# Patient Record
(demographics unavailable — no encounter records)

---

## 2024-11-27 NOTE — HEALTH RISK ASSESSMENT
[Yes] : Yes [2 - 4 times a month (2 pts)] : 2-4 times a month (2 points) [1 or 2 (0 pts)] : 1 or 2 (0 points) [Never (0 pts)] : Never (0 points) [0] : 2) Feeling down, depressed, or hopeless: Not at all (0) [PHQ-2 Negative - No further assessment needed] : PHQ-2 Negative - No further assessment needed [Student] : student [Single] : single [Feels Safe at Home] : Feels safe at home [Fully functional (bathing, dressing, toileting, transferring, walking, feeding)] : Fully functional (bathing, dressing, toileting, transferring, walking, feeding) [Fully functional (using the telephone, shopping, preparing meals, housekeeping, doing laundry, using] : Fully functional and needs no help or supervision to perform IADLs (using the telephone, shopping, preparing meals, housekeeping, doing laundry, using transportation, managing medications and managing finances) [Never] : Never [Audit-CScore] : 2 [de-identified] : smoked cannibis nightly but recently stopped  [[id number]] : 0 [Sexually Active] : not sexually active [de-identified] : mother, father, biological brother/sister, adopted brother  [FreeTextEntry2] : 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

## 2024-11-27 NOTE — ASSESSMENT
[FreeTextEntry1] : Health Care Maintenance - well visit - routine labs ordered; patient consents to STD testing - depression screen negative - flu vaccine- declines  - covid vaccines- reports 2 doses  - tdap 1/2023 - advised to get annual eye exams with optometry/ophthalmology, skin exams with dermatology, and dental exams - RTC for CPE in 1 year or sooner prn  Asthma -controlled off medications, sometimes requires inhalers when sick  Anxiety/depression OCD ADHD -not on any meds -follows Therapist Shay crowley
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
## 2025-01-29 NOTE — ADDENDUM
[FreeTextEntry1] :  I, Ale Christianson, acted solely as a scribe for Dr. Jed Vernon MD on this date 01/29/2025    All medical record entries made by the Scribe were at my, Dr. Jed Vernon MD., direction and personally dictated by me on 01/29/2025 . I have reviewed the chart and agree that the record accurately reflects my personal performance of the history, physical exam, assessment and plan. I have also personally directed, reviewed, and agreed with the chart.

## 2025-01-29 NOTE — PHYSICAL EXAM
[de-identified] :  Cervical Physical Exam   Gait - Normal   Station - Normal   Sagittal balance - Normal   Compensatory mechanism? - None   Horizontal gaze - Maintained     Reflexes Biceps - Normal Triceps - Normal Brachioradialis - Normal Patellar - Normal Gastroc - Normal Clonus -No   Hoffmans - None   Shoulder Exam - Normal   Spurlings - None   Wrist Pulses -2+ radial/ulnar   Foot Pulses -2+ DP/PT   Cervical range of motion - Normal   Sensation C5-T1 sensation intact to light touch bilaterally   L1-S1 sensation intact to light touch bilaterally   Motor   Deltoid Biceps Triceps WF WE IO  Right 5/5 5/5 5/5 5/5 5/5 5/5 5/5 Left 5/5 5/5 5/5 5/5 5/5 5/5 5/5   IP Quad HS TA Gastroc EHL Right 3+/5 5/5 5/5 5/5 5/5 5/5 Left 3+/5 5/5 5/5 5/5 5/5 5/5 [de-identified] : Xray from [facility] Lumbar radiographs no significant facet arthropathy no significant disc degeneration

## 2025-01-29 NOTE — ASSESSMENT
[FreeTextEntry1] : I had a lengthy discussion with the patient in regards to their treatment plan and diagnosis.  They do have objective weakness findings on my exam.  Their symptoms have persisted despite the conservative management they have attempted thus far.  As a result I would like to proceed with a lumbar MRI.  In tandem with this they should begin a physical therapy/home therapy program.  The patient can take Diclofenac and Tylenol as needed for pain control if medically able to.  I will have the patient follow-up in 3 to 4 weeks for repeat clinical evaluation.  I encouraged them to follow-up sooner if their symptoms worsen or change in any way.